# Patient Record
Sex: FEMALE | Race: AMERICAN INDIAN OR ALASKA NATIVE
[De-identification: names, ages, dates, MRNs, and addresses within clinical notes are randomized per-mention and may not be internally consistent; named-entity substitution may affect disease eponyms.]

---

## 2020-11-03 ENCOUNTER — HOSPITAL ENCOUNTER (EMERGENCY)
Dept: HOSPITAL 5 - ED | Age: 35
Discharge: HOME | End: 2020-11-03
Payer: COMMERCIAL

## 2020-11-03 VITALS — DIASTOLIC BLOOD PRESSURE: 95 MMHG | SYSTOLIC BLOOD PRESSURE: 126 MMHG

## 2020-11-03 DIAGNOSIS — R50.9: ICD-10-CM

## 2020-11-03 DIAGNOSIS — J02.9: Primary | ICD-10-CM

## 2020-11-03 PROCEDURE — 99282 EMERGENCY DEPT VISIT SF MDM: CPT

## 2020-11-03 NOTE — EMERGENCY DEPARTMENT REPORT
ED ENT HPI





- General


Chief complaint: Sore Throat


Stated complaint: SORE THROAT/RUNNY NOSE


Time Seen by Provider: 11/03/20 23:16


Source: patient


Mode of arrival: Ambulatory


Limitations: No Limitations





- History of Present Illness


Initial comments: 


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 

fever for the past 2 days.  Symptoms include scratchy burning throat pain is 

described at 4/10 exacerbated by swallowing.  Pain is relieved by nothing.  

Patient is tolerating p.o. intake without nausea vomiting however.  There is 

some ear pain, patient has history of pharyngitis, and Covid positive previously

this year.  There is no cough, no shortness of breath, no wheezing, no stridor.








- Related Data


                                  Previous Rx's











 Medication  Instructions  Recorded  Last Taken  Type


 


Azithromycin 500 mg PO DAILY #5 tablet 11/03/20 Unknown Rx


 


Ibuprofen [Motrin 800 MG tab] 800 mg PO Q8HR PRN #30 tablet 11/03/20 Unknown Rx











                                    Allergies











Allergy/AdvReac Type Severity Reaction Status Date / Time


 


No Known Allergies Allergy   Unverified 11/03/20 21:47














ED Dental HPI





- General


Chief complaint: Sore Throat


Stated complaint: SORE THROAT/RUNNY NOSE


Time Seen by Provider: 11/03/20 23:16


Source: patient


Mode of arrival: Ambulatory


Limitations: No Limitations





- Related Data


                                  Previous Rx's











 Medication  Instructions  Recorded  Last Taken  Type


 


Azithromycin 500 mg PO DAILY #5 tablet 11/03/20 Unknown Rx


 


Ibuprofen [Motrin 800 MG tab] 800 mg PO Q8HR PRN #30 tablet 11/03/20 Unknown Rx











                                    Allergies











Allergy/AdvReac Type Severity Reaction Status Date / Time


 


No Known Allergies Allergy   Unverified 11/03/20 21:47














ED Review of Systems


ROS: 


Stated complaint: SORE THROAT/RUNNY NOSE


Other details as noted in HPI





Constitutional: fever, malaise


Eyes: denies: eye pain, eye discharge, vision change


ENT: throat pain, congestion


Respiratory: denies: cough, shortness of breath, wheezing


Cardiovascular: denies: chest pain, palpitations


Endocrine: no symptoms reported


Gastrointestinal: denies: abdominal pain, nausea, diarrhea


Genitourinary: denies: urgency, dysuria, discharge


Musculoskeletal: denies: back pain, joint swelling, arthralgia


Skin: denies: rash, lesions


Neurological: denies: headache, weakness, paresthesias


Psychiatric: denies: anxiety, depression


Hematological/Lymphatic: denies: easy bleeding, easy bruising





ED Past Medical Hx





- Past Medical History


Previous Medical History?: No





- Surgical History


Past Surgical History?: Yes


Additional Surgical History: Hysterectomy,





- Social History


Smoking Status: Never Smoker


Substance Use Type: Alcohol





- Medications


Home Medications: 


                                Home Medications











 Medication  Instructions  Recorded  Confirmed  Last Taken  Type


 


Azithromycin 500 mg PO DAILY #5 tablet 11/03/20  Unknown Rx


 


Ibuprofen [Motrin 800 MG tab] 800 mg PO Q8HR PRN #30 tablet 11/03/20  Unknown Rx














ED Physical Exam





- General


Limitations: No Limitations


General appearance: alert, in no apparent distress





- Head


Head exam: Present: atraumatic, normocephalic





- Eye


Eye exam: Present: normal appearance, EOMI


Pupils: Present: normal accommodation





- ENT


ENT exam: Present: mucous membranes moist, TM's normal bilaterally, normal 

external ear exam





- Expanded ENT Exam


  ** Expanded


Ear exam: Present: normal external inspection


Throat exam: Positive: tonsillar erythema, tonsillomegaly, tonsillar exudate.  

Negative: R peritonsillar mass, L peritonsillar mass





- Neck


Neck exam: Present: normal inspection, full ROM, lymphadenopathy.  Absent: 

tenderness, meningismus, thyromegaly





- Respiratory


Respiratory exam: Present: normal lung sounds bilaterally.  Absent: respiratory 

distress, wheezes, stridor, chest wall tenderness





- Cardiovascular


Cardiovascular Exam: Present: regular rate, normal rhythm, normal heart sounds. 

Absent: systolic murmur, diastolic murmur, rubs, gallop





- GI/Abdominal


GI/Abdominal exam: Present: soft, normal bowel sounds.  Absent: distended, 

tenderness, guarding, rebound, rigid, bruit, hernia





- Rectal


Rectal exam: Present: deferred





- Extremities Exam


Extremities exam: Present: normal inspection, full ROM.  Absent: tenderness





- Back Exam


Back exam: Present: normal inspection, full ROM.  Absent: tenderness





- Neurological Exam


Neurological exam: Present: alert, oriented X3, CN II-XII intact, normal gait





- Expanded Neurological Exam


  ** Expanded


Patient oriented to: Present: person, place, time


Best Eye Response (April): (4) open spontaneously


Best Motor Response (April): (6) obeys commands


Best Verbal Response (April): (5) oriented


[place] Total: 15





- Psychiatric


Psychiatric exam: Present: normal affect, normal mood





- Skin


Skin exam: Present: warm, dry, intact, normal color.  Absent: rash





ED Course





                                   Vital Signs











  11/03/20





  21:48


 


Temperature 98.0 F


 


Pulse Rate 99 H


 


Respiratory 17





Rate 


 


Blood Pressure 126/95


 


O2 Sat by Pulse 98





Oximetry 














ED Medical Decision Making





- Medical Decision Making


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

care floor, pt will continue to monitor fever , cough, sob, and worsening 

symptoms, will follow up with primary care in 2-3 days. pt dc'd in stable 

condition at this time. 





Critical care attestation.: 


If time is entered above; I have spent that time in minutes in the direct care 

of this critically ill patient, excluding procedure time.








ED Disposition


Clinical Impression: 


Pharyngitis


Qualifiers:


 Pharyngitis/tonsillitis etiology: unspecified etiology Qualified Code(s): J02.9

- Acute pharyngitis, unspecified





Disposition: DC-01 TO HOME OR SELFCARE


Is pt being admited?: No


Does the pt Need Aspirin: No


Condition: Stable


Instructions:  Pharyngitis (ED)


Prescriptions: 


Azithromycin 500 mg PO DAILY #5 tablet


Ibuprofen [Motrin 800 MG tab] 800 mg PO Q8HR PRN #30 tablet


 PRN Reason: pain fever 


Referrals: 


ROSEANN ROGERS MD [Staff Physician] - 3-5 Days


Forms:  Work/School Release Form(ED)


Time of Disposition: 23:24

## 2021-05-28 NOTE — EMERGENCY DEPARTMENT REPORT
ED Extremity Problem HPI





- General


Chief complaint: Shoulder Injury


Stated complaint: RT SHOULDER PAIN


Time Seen by Provider: 05/28/21 21:01


Source: patient


Mode of arrival: Ambulatory


Limitations: No Limitations





- History of Present Illness


Initial comments: 





This is a 35-year-old female with no prior medical history who presents the ED 

complaining of right shoulder pain that began on Wednesday 2 days ago. Patient 

states that on Sunday 6 days prior she was playing basketball without any 

injuries. Patient states that few days after Sunday she started to return and 

bit her shoulder pain and got worse Wednesday and got even worse yesterday. 

Patient states that she had pain with elevating and moving the shoulder. Patient

states that she did not sustain any falls or trauma to the shoulder. States that

pain is localized to the anterior and posterior aspect of the shoulder. She 

denies any erythema swelling of the shoulder joint.


MD Complaint: extremity pain





- Related Data


                                  Previous Rx's











 Medication  Instructions  Recorded  Last Taken  Type


 


Azithromycin 500 mg PO DAILY #5 tablet 11/03/20 Unknown Rx


 


Ibuprofen [Motrin 800 MG tab] 800 mg PO Q8HR PRN #30 tablet 11/03/20 Unknown Rx


 


Acetaminophen/Codeine [Tylenol 1 tab PO Q6H #10 tab 05/28/21 Unknown Rx





/Codeine # 3 tab]    


 


Diclofenac Dr [Voltaren Dr] 75 mg PO BID #30 tablet 05/28/21 Unknown Rx


 


Metaxalone [Skelaxin] 800 mg PO TID #20 tablet 05/28/21 Unknown Rx











                                    Allergies











Allergy/AdvReac Type Severity Reaction Status Date / Time


 


No Known Allergies Allergy   Unverified 11/03/20 21:47














ED Review of Systems


ROS: 


Stated complaint: RT SHOULDER PAIN


Other details as noted in HPI





Comment: All other systems reviewed and negative





ED Past Medical Hx





- Past Medical History


Previous Medical History?: Yes


Hx Kidney Stones: Yes





- Surgical History


Additional Surgical History: Hysterectomy,.  Right knee.  Left jaw.  Left ovary





- Social History


Smoking Status: Current Every Day Smoker


Substance Use Type: None





- Medications


Home Medications: 


                                Home Medications











 Medication  Instructions  Recorded  Confirmed  Last Taken  Type


 


Azithromycin 500 mg PO DAILY #5 tablet 11/03/20  Unknown Rx


 


Ibuprofen [Motrin 800 MG tab] 800 mg PO Q8HR PRN #30 tablet 11/03/20  Unknown Rx


 


Acetaminophen/Codeine [Tylenol 1 tab PO Q6H #10 tab 05/28/21  Unknown Rx





/Codeine # 3 tab]     


 


Diclofenac Dr [Voltaren Dr] 75 mg PO BID #30 tablet 05/28/21  Unknown Rx


 


Metaxalone [Skelaxin] 800 mg PO TID #20 tablet 05/28/21  Unknown Rx














ED Physical Exam





- General


Limitations: No Limitations


General appearance: alert, in no apparent distress





- Head


Head exam: Present: atraumatic, normocephalic





- Eye


Eye exam: Present: normal appearance





- ENT


ENT exam: Present: mucous membranes moist





- Neck


Neck exam: Present: normal inspection





- Respiratory


Respiratory exam: Present: normal lung sounds bilaterally.  Absent: respiratory 

distress





- Cardiovascular


Cardiovascular Exam: Present: regular rate, normal rhythm.  Absent: systolic 

murmur, diastolic murmur, rubs, gallop





- GI/Abdominal


GI/Abdominal exam: Present: soft, normal bowel sounds





- Extremities Exam


Extremities exam: Present: normal inspection





- Expanded Upper Extremity Exam


  ** Right


General: Present: normal inspection


Shoulder Exam: Present: normal inspection, tenderness, other (Pain with range of

 motion).  Absent: full ROM, swelling, abrasion, laceration


Upper Arm exam: Present: normal inspection, full ROM.  Absent: tenderness


Elbow exam: Present: normal inspection, full ROM.  Absent: tenderness, swelling,

 abrasion


Forearm Wrist exam: Present: normal inspection, full ROM.  Absent: tenderness, 

swelling


Hand Wrist exam: Present: normal inspection, full ROM.  Absent: tenderness, 

swelling


Neuro motor exam: Present: wrist extension intact


Vascular: Absent: vascular compromise





- Back Exam


Back exam: Present: normal inspection, full ROM





- Neurological Exam


Neurological exam: Present: alert, oriented X3





- Psychiatric


Psychiatric exam: Present: normal affect, normal mood





- Skin


Skin exam: Present: warm, dry, intact, normal color.  Absent: rash





ED Course


                                   Vital Signs











  05/28/21 05/28/21





  19:57 22:00


 


Temperature 98.0 F 98.2 F


 


Pulse Rate 80 68


 


Respiratory 16 15





Rate  


 


Blood Pressure 127/86 


 


Blood Pressure  116/62





[Left]  


 


O2 Sat by Pulse 99 100





Oximetry  














ED Medical Decision Making





- Radiology Data


Radiology results: report reviewed, image reviewed


RIGHT SHOULDER 3 VIEWS  


 


 INDICATION / CLINICAL INFORMATION: Right shoulder injury playing basketball on 

Sunday. Worsening 


pain since Wednesday with difficulty moving the right shoulder.  


 


 COMPARISON: None available.  


 


 FINDINGS:  


 


 BONES / JOINT(S): The joint spaces are well-maintained. No significant 

arthritis. There is no 


evidence of fracture or subluxation.  


 


 SOFT TISSUES: No significant abnormality.  


 


 ADDITIONAL FINDINGS: The visualized right lung is clear.  


 


 IMPRESSION: No acute abnormality.  


 


 Signer Name: Mele Ramos MD   


 Signed: 5/28/2021 8:38 PM  


 Workstation Name: [id number]   


 


 


Transcribed By: RT  


Dictated By: Mele Ramos MD  


Electronically Authenticated By: Mele Ramos MD    


Signed Date/Time: 05/28/21 2038                                


 


 


 


DD/DT: 05/28/21 2037                                                            

  


TD/TT:








- Medical Decision Making


This 35-year-old female presents to ED complaining shoulder pain secondary to 

tendinitis.


X-ray shows no acute findings.


Discussed x-ray findings with the patient.  Discussed with patient follow-up 

with primary care physician.


Discussed rest ice application to the shoulder joint.


Patient was in no acute distress patient received pain medication in the ED


Patient had no neurological deficit throughout ED stay.





Critical care attestation.: 


If time is entered above; I have spent that time in minutes in the direct care 

of this critically ill patient, excluding procedure time.








ED Disposition


Clinical Impression: 


 Right shoulder tendonitis, Shoulder pain





Disposition: DC-01 TO HOME OR SELFCARE


Is pt being admited?: No


Does the pt Need Aspirin: No


Condition: Stable


Instructions:  Shoulder Pain, How to Use Cold Therapy, Easy-to-Read, Joint Pain,

 Easy-to-Read


Additional Instructions: 


Make sure to follow up with the orthopedic Dr. Pacheco as discussed.


Take all your medications as you've been prescribed.


If you have any worsening symptoms or develop new symptoms please return to ED 

immediately.


Prescriptions: 


Metaxalone [Skelaxin] 800 mg PO TID #20 tablet


Acetaminophen/Codeine [Tylenol /Codeine # 3 tab] 1 tab PO Q6H #10 tab


Diclofenac Dr [Voltaregeraldo Garcia] 75 mg PO BID #30 tablet


Referrals: 


PRIMARY CARE,MD [Primary Care Provider] - 3-5 Days


MELE PACHECO MD [Staff Physician] - 3-5 Days


Forms:  Accompanied Note, Work/School Release Form(ED)


Time of Disposition: 21:41

## 2021-05-28 NOTE — XRAY REPORT
RIGHT SHOULDER 3 VIEWS



INDICATION / CLINICAL INFORMATION: Right shoulder injury playing basketball on Sunday. Worsening pain
 since Wednesday with difficulty moving the right shoulder.



COMPARISON: None available.

 

FINDINGS:



BONES / JOINT(S): The joint spaces are well-maintained. No significant arthritis. There is no evidenc
e of fracture or subluxation.



SOFT TISSUES: No significant abnormality.



ADDITIONAL FINDINGS: The visualized right lung is clear.



IMPRESSION: No acute abnormality.



Signer Name: Mele Ramos MD 

Signed: 5/28/2021 8:38 PM

Workstation Name: [id number]